# Patient Record
Sex: MALE
[De-identification: names, ages, dates, MRNs, and addresses within clinical notes are randomized per-mention and may not be internally consistent; named-entity substitution may affect disease eponyms.]

---

## 2024-07-11 ENCOUNTER — APPOINTMENT (OUTPATIENT)
Dept: ORTHOPEDIC SURGERY | Facility: CLINIC | Age: 29
End: 2024-07-11
Payer: COMMERCIAL

## 2024-07-11 VITALS — BODY MASS INDEX: 23 KG/M2 | WEIGHT: 185 LBS | HEIGHT: 75 IN

## 2024-07-11 DIAGNOSIS — M23.92 UNSPECIFIED INTERNAL DERANGEMENT OF LEFT KNEE: ICD-10-CM

## 2024-07-11 PROBLEM — Z00.00 ENCOUNTER FOR PREVENTIVE HEALTH EXAMINATION: Status: ACTIVE | Noted: 2024-07-11

## 2024-07-11 PROCEDURE — 73562 X-RAY EXAM OF KNEE 3: CPT | Mod: LT

## 2024-07-11 PROCEDURE — 99203 OFFICE O/P NEW LOW 30 MIN: CPT | Mod: 25

## 2024-07-25 ENCOUNTER — APPOINTMENT (OUTPATIENT)
Dept: ORTHOPEDIC SURGERY | Facility: CLINIC | Age: 29
End: 2024-07-25
Payer: COMMERCIAL

## 2024-07-25 DIAGNOSIS — M94.262 CHONDROMALACIA, LEFT KNEE: ICD-10-CM

## 2024-07-25 PROCEDURE — 99213 OFFICE O/P EST LOW 20 MIN: CPT

## 2024-07-26 NOTE — PHYSICAL EXAM
[de-identified] : Left knee  Constitutional:  The patient is healthy-appearing and in no apparent distress.   Gait: The patient ambulates with a normal gait and no limp.  Cardiovascular System:  The capillary refill is less than 2 seconds.   Skin:  There are no skin abnormalities.  Left Knee:   Bony Palpation:  There is no tenderness of the medial joint line.  There is no tenderness of the lateral joint line. There is no tenderness of the medial femoral chondyle. There is no tenderness of the lateral femoral chondyle. There is no tenderness of the tibial tubercle. There is no tenderness of the superior patella. There is no tenderness of the inferior patella. There is tenderness of the medial patellar facet. There is tenderness of the lateral patellar facet.  Soft Tissue Palpation:  There is no tenderness of the medial retinaculum. There is no tenderness of the lateral retinaculum. There is no tenderness of the quadriceps tendon. There is no tenderness of the patella tendon. There is no tenderness of the ITB. There is no tenderness of the pes anserine.  Active Range of Motion:  The range of motion at the knee actively and passively is full.  There is a tight lateral retinaculum.  Special Tests:  There is a negative Apley. There is a negative Steinmanns.  There is a negative Lachman and Anterior Drawer. There is a negative Posterior Drawer.   There is no varus or valgus laxity.  Strength:  There is 5/5 hip flexion and 5/5 knee flexion and extension.    Psychiatric:  The patient demonstrates a normal mood and affect and is active and alert.  [de-identified] : MRI left knee:  There is mild lateral patellar tilt with a hypoplastic trochlea as well as a focal lateral facet chondral wear

## 2024-07-26 NOTE — HISTORY OF PRESENT ILLNESS
[de-identified] : Patient is an established patient presenting for follow-up regarding his left knee.  + MRI

## 2024-07-26 NOTE — PHYSICAL EXAM
[de-identified] : Left knee  Constitutional:  The patient is healthy-appearing and in no apparent distress.   Gait: The patient ambulates with a normal gait and no limp.  Cardiovascular System:  The capillary refill is less than 2 seconds.   Skin:  There are no skin abnormalities.  Left Knee:   Bony Palpation:  There is no tenderness of the medial joint line.  There is no tenderness of the lateral joint line. There is no tenderness of the medial femoral chondyle. There is no tenderness of the lateral femoral chondyle. There is no tenderness of the tibial tubercle. There is no tenderness of the superior patella. There is no tenderness of the inferior patella. There is tenderness of the medial patellar facet. There is tenderness of the lateral patellar facet.  Soft Tissue Palpation:  There is no tenderness of the medial retinaculum. There is no tenderness of the lateral retinaculum. There is no tenderness of the quadriceps tendon. There is no tenderness of the patella tendon. There is no tenderness of the ITB. There is no tenderness of the pes anserine.  Active Range of Motion:  The range of motion at the knee actively and passively is full.  There is a tight lateral retinaculum.  Special Tests:  There is a negative Apley. There is a negative Steinmanns.  There is a negative Lachman and Anterior Drawer. There is a negative Posterior Drawer.   There is no varus or valgus laxity.  Strength:  There is 5/5 hip flexion and 5/5 knee flexion and extension.    Psychiatric:  The patient demonstrates a normal mood and affect and is active and alert.  [de-identified] : MRI left knee:  There is mild lateral patellar tilt with a hypoplastic trochlea as well as a focal lateral facet chondral wear

## 2024-07-26 NOTE — ASSESSMENT
[FreeTextEntry1] : Discussed at length with patient regarding MRI and treatment options. Patient elects formal PT and home exercises.  If no improvement consideration to arthroscopy with lateral release and possible chondral resolution.